# Patient Record
Sex: MALE | URBAN - METROPOLITAN AREA
[De-identification: names, ages, dates, MRNs, and addresses within clinical notes are randomized per-mention and may not be internally consistent; named-entity substitution may affect disease eponyms.]

---

## 2024-06-27 ENCOUNTER — APPOINTMENT (EMERGENCY)
Dept: ULTRASOUND IMAGING | Facility: HOSPITAL | Age: 24
End: 2024-06-27
Payer: COMMERCIAL

## 2024-06-27 ENCOUNTER — HOSPITAL ENCOUNTER (EMERGENCY)
Facility: HOSPITAL | Age: 24
Discharge: HOME | End: 2024-06-27
Attending: EMERGENCY MEDICINE
Payer: COMMERCIAL

## 2024-06-27 VITALS
DIASTOLIC BLOOD PRESSURE: 75 MMHG | SYSTOLIC BLOOD PRESSURE: 131 MMHG | HEART RATE: 64 BPM | WEIGHT: 113.7 LBS | OXYGEN SATURATION: 100 % | TEMPERATURE: 98.3 F | RESPIRATION RATE: 16 BRPM

## 2024-06-27 DIAGNOSIS — R10.10 UPPER ABDOMINAL PAIN, UNSPECIFIED: Primary | ICD-10-CM

## 2024-06-27 LAB
ALBUMIN SERPL BCG-MCNC: 4.9 G/DL (ref 3.5–5)
ALP SERPL-CCNC: 57 U/L (ref 34–104)
ALT SERPL W P-5'-P-CCNC: 11 U/L (ref 7–52)
ANION GAP SERPL CALCULATED.3IONS-SCNC: 9 MMOL/L (ref 4–13)
AST SERPL W P-5'-P-CCNC: 19 U/L (ref 13–39)
BASOPHILS # BLD AUTO: 0.06 THOUSANDS/ÂΜL (ref 0–0.1)
BASOPHILS NFR BLD AUTO: 1 % (ref 0–1)
BILIRUB SERPL-MCNC: 0.76 MG/DL (ref 0.2–1)
BUN SERPL-MCNC: 20 MG/DL (ref 5–25)
CALCIUM SERPL-MCNC: 9.7 MG/DL (ref 8.4–10.2)
CHLORIDE SERPL-SCNC: 102 MMOL/L (ref 96–108)
CO2 SERPL-SCNC: 27 MMOL/L (ref 21–32)
CREAT SERPL-MCNC: 0.78 MG/DL (ref 0.6–1.3)
EOSINOPHIL # BLD AUTO: 1.61 THOUSAND/ÂΜL (ref 0–0.61)
EOSINOPHIL NFR BLD AUTO: 15 % (ref 0–6)
ERYTHROCYTE [DISTWIDTH] IN BLOOD BY AUTOMATED COUNT: 11.8 % (ref 11.6–15.1)
GFR SERPL CREATININE-BSD FRML MDRD: 128 ML/MIN/1.73SQ M
GLUCOSE SERPL-MCNC: 95 MG/DL (ref 65–140)
HCT VFR BLD AUTO: 47.2 % (ref 36.5–49.3)
HGB BLD-MCNC: 16.2 G/DL (ref 12–17)
IMM GRANULOCYTES # BLD AUTO: 0.04 THOUSAND/UL (ref 0–0.2)
IMM GRANULOCYTES NFR BLD AUTO: 0 % (ref 0–2)
LIPASE SERPL-CCNC: 19 U/L (ref 11–82)
LYMPHOCYTES # BLD AUTO: 1.62 THOUSANDS/ÂΜL (ref 0.6–4.47)
LYMPHOCYTES NFR BLD AUTO: 15 % (ref 14–44)
MCH RBC QN AUTO: 30.3 PG (ref 26.8–34.3)
MCHC RBC AUTO-ENTMCNC: 34.3 G/DL (ref 31.4–37.4)
MCV RBC AUTO: 88 FL (ref 82–98)
MONOCYTES # BLD AUTO: 0.68 THOUSAND/ÂΜL (ref 0.17–1.22)
MONOCYTES NFR BLD AUTO: 6 % (ref 4–12)
NEUTROPHILS # BLD AUTO: 6.79 THOUSANDS/ÂΜL (ref 1.85–7.62)
NEUTS SEG NFR BLD AUTO: 63 % (ref 43–75)
NRBC BLD AUTO-RTO: 0 /100 WBCS
PLATELET # BLD AUTO: 179 THOUSANDS/UL (ref 149–390)
PMV BLD AUTO: 9.2 FL (ref 8.9–12.7)
POTASSIUM SERPL-SCNC: 3.9 MMOL/L (ref 3.5–5.3)
PROT SERPL-MCNC: 8 G/DL (ref 6.4–8.4)
RBC # BLD AUTO: 5.34 MILLION/UL (ref 3.88–5.62)
SODIUM SERPL-SCNC: 138 MMOL/L (ref 135–147)
WBC # BLD AUTO: 10.8 THOUSAND/UL (ref 4.31–10.16)

## 2024-06-27 PROCEDURE — 96360 HYDRATION IV INFUSION INIT: CPT

## 2024-06-27 PROCEDURE — 83690 ASSAY OF LIPASE: CPT | Performed by: PHYSICIAN ASSISTANT

## 2024-06-27 PROCEDURE — 99284 EMERGENCY DEPT VISIT MOD MDM: CPT | Performed by: PHYSICIAN ASSISTANT

## 2024-06-27 PROCEDURE — 99284 EMERGENCY DEPT VISIT MOD MDM: CPT

## 2024-06-27 PROCEDURE — 85025 COMPLETE CBC W/AUTO DIFF WBC: CPT | Performed by: PHYSICIAN ASSISTANT

## 2024-06-27 PROCEDURE — 36415 COLL VENOUS BLD VENIPUNCTURE: CPT | Performed by: PHYSICIAN ASSISTANT

## 2024-06-27 PROCEDURE — 76705 ECHO EXAM OF ABDOMEN: CPT

## 2024-06-27 PROCEDURE — 80053 COMPREHEN METABOLIC PANEL: CPT | Performed by: PHYSICIAN ASSISTANT

## 2024-06-27 RX ADMIN — SODIUM CHLORIDE 1000 ML: 0.9 INJECTION, SOLUTION INTRAVENOUS at 16:40

## 2024-06-27 NOTE — ED NOTES
Pt in no apparent distress at this time.  Resps easy and regular.    Awaiting US report.     Ag Lowry RN  06/27/24 1942

## 2024-06-27 NOTE — ED PROVIDER NOTES
"History  Chief Complaint   Patient presents with    Abdominal Pain     2 weeks of abd pain that gets worse after eating     24-year-old male presents today for evaluation of right upper quadrant abdominal pain worse after eating.  He reports abdominal surgical history only significant for hernia repair when he was \"a baby\".  He reports he is unsure of what hernia he had repaired.  He denies fevers, chills, n/v/d.  He reports no urinary symptoms and reports no radiation to the back.  He reports he does not drink alcohol regularly and denies any medical conditions to his knowledge.      Abdominal Pain  Associated symptoms: no chest pain, no chills, no dysuria, no fatigue, no fever, no hematuria, no nausea, no shortness of breath, no sore throat and no vomiting        None       History reviewed. No pertinent past medical history.    Past Surgical History:   Procedure Laterality Date    HERNIA REPAIR         History reviewed. No pertinent family history.  I have reviewed and agree with the history as documented.    E-Cigarette/Vaping     E-Cigarette/Vaping Substances     Social History     Tobacco Use    Smoking status: Never     Passive exposure: Never    Smokeless tobacco: Never   Substance Use Topics    Alcohol use: Yes     Comment: occ    Drug use: Never       Review of Systems   Constitutional:  Negative for chills, fatigue and fever.   HENT:  Negative for congestion, ear pain, rhinorrhea and sore throat.    Eyes:  Negative for redness.   Respiratory:  Negative for chest tightness and shortness of breath.    Cardiovascular:  Negative for chest pain and palpitations.   Gastrointestinal:  Positive for abdominal pain. Negative for nausea and vomiting.   Genitourinary:  Negative for dysuria and hematuria.   Musculoskeletal: Negative.    Skin:  Negative for rash.   Neurological:  Negative for dizziness, syncope, light-headedness and numbness.       Physical Exam  Physical Exam  Vitals and nursing note reviewed. "   Constitutional:       Appearance: Normal appearance. He is well-developed.   HENT:      Head: Normocephalic and atraumatic.   Eyes:      General: No scleral icterus.     Pupils: Pupils are equal, round, and reactive to light.   Cardiovascular:      Rate and Rhythm: Normal rate and regular rhythm.      Pulses: Normal pulses.   Pulmonary:      Effort: Pulmonary effort is normal. No respiratory distress.      Breath sounds: No stridor.   Abdominal:      General: There is no distension.      Palpations: There is no mass.   Musculoskeletal:      Cervical back: Normal range of motion.   Skin:     General: Skin is warm and dry.      Capillary Refill: Capillary refill takes less than 2 seconds.      Coloration: Skin is not jaundiced.   Neurological:      Mental Status: He is alert and oriented to person, place, and time.      Gait: Gait normal.   Psychiatric:         Mood and Affect: Mood normal.         Vital Signs  ED Triage Vitals   Temperature Pulse Respirations Blood Pressure SpO2   06/27/24 1620 06/27/24 1620 06/27/24 1620 06/27/24 1620 06/27/24 1620   98.3 °F (36.8 °C) 86 18 162/92 99 %      Temp Source Heart Rate Source Patient Position - Orthostatic VS BP Location FiO2 (%)   06/27/24 1620 06/27/24 1620 06/27/24 1620 06/27/24 1620 --   Oral Monitor Lying Left arm       Pain Score       06/27/24 1752       No Pain           Vitals:    06/27/24 1620 06/27/24 1935   BP: 162/92 131/75   Pulse: 86 64   Patient Position - Orthostatic VS: Lying          Visual Acuity      ED Medications  Medications   sodium chloride 0.9 % bolus 1,000 mL (0 mL Intravenous Stopped 6/27/24 1748)       Diagnostic Studies  Results Reviewed       Procedure Component Value Units Date/Time    Comprehensive metabolic panel [[id number]] Collected: 06/27/24 1640    Lab Status: Final result Specimen: Blood from Arm, Right Updated: 06/27/24 1712     Sodium 138 mmol/L      Potassium 3.9 mmol/L      Chloride 102 mmol/L      CO2 27 mmol/L      ANION GAP  9 mmol/L      BUN 20 mg/dL      Creatinine 0.78 mg/dL      Glucose 95 mg/dL      Calcium 9.7 mg/dL      AST 19 U/L      ALT 11 U/L      Alkaline Phosphatase 57 U/L      Total Protein 8.0 g/dL      Albumin 4.9 g/dL      Total Bilirubin 0.76 mg/dL      eGFR 128 ml/min/1.73sq m     Narrative:      National Kidney Disease Foundation guidelines for Chronic Kidney Disease (CKD):     Stage 1 with normal or high GFR (GFR > 90 mL/min/1.73 square meters)    Stage 2 Mild CKD (GFR = 60-89 mL/min/1.73 square meters)    Stage 3A Moderate CKD (GFR = 45-59 mL/min/1.73 square meters)    Stage 3B Moderate CKD (GFR = 30-44 mL/min/1.73 square meters)    Stage 4 Severe CKD (GFR = 15-29 mL/min/1.73 square meters)    Stage 5 End Stage CKD (GFR <15 mL/min/1.73 square meters)  Note: GFR calculation is accurate only with a steady state creatinine    Lipase [[id number]]  (Normal) Collected: 06/27/24 1640    Lab Status: Final result Specimen: Blood from Arm, Right Updated: 06/27/24 1712     Lipase 19 u/L     CBC and differential [[id number]]  (Abnormal) Collected: 06/27/24 1640    Lab Status: Final result Specimen: Blood from Arm, Right Updated: 06/27/24 1655     WBC 10.80 Thousand/uL      RBC 5.34 Million/uL      Hemoglobin 16.2 g/dL      Hematocrit 47.2 %      MCV 88 fL      MCH 30.3 pg      MCHC 34.3 g/dL      RDW 11.8 %      MPV 9.2 fL      Platelets 179 Thousands/uL      nRBC 0 /100 WBCs      Segmented % 63 %      Immature Grans % 0 %      Lymphocytes % 15 %      Monocytes % 6 %      Eosinophils Relative 15 %      Basophils Relative 1 %      Absolute Neutrophils 6.79 Thousands/µL      Absolute Immature Grans 0.04 Thousand/uL      Absolute Lymphocytes 1.62 Thousands/µL      Absolute Monocytes 0.68 Thousand/µL      Eosinophils Absolute 1.61 Thousand/µL      Basophils Absolute 0.06 Thousands/µL                    US right upper quadrant   Final Result by Corey Martínez MD (06/27 1934)      Normal right upper quadrant ultrasound. No  sonographic evidence of acute cholecystitis.      Workstation performed: [id number]                    Procedures  Procedures         ED Course  ED Course as of 06/27/24 2158   Thu Jun 27, 2024 1946      Normal right upper quadrant ultrasound. No sonographic evidence of acute cholecystitis.     1946 Patient was reexamined at this time and informed of laboratory and/or imaging results and was found to be stable for discharge.  Return to emergency department criteria was reviewed with the patient who verbalized understanding and was agreeable to discharge and the treatment plan at this time.                                   SBIRT 20yo+      Flowsheet Row Most Recent Value   Initial Alcohol Screen: US AUDIT-C     1. How often do you have a drink containing alcohol? 0 Filed at: 06/27/2024 1649   2. How many drinks containing alcohol do you have on a typical day you are drinking?  0 Filed at: 06/27/2024 1649   3a. Male UNDER 65: How often do you have five or more drinks on one occasion? 0 Filed at: 06/27/2024 1649   3b. FEMALE Any Age, or MALE 65+: How often do you have 4 or more drinks on one occassion? 0 Filed at: 06/27/2024 1649   Audit-C Score 0 Filed at: 06/27/2024 1649   MARCUS: How many times in the past year have you...    Used an illegal drug or used a prescription medication for non-medical reasons? Never Filed at: 06/27/2024 1649                      Medical Decision Making  24-year-old male presenting for evaluation of right upper quadrant abdominal pain intermittent and ongoing for the past 2 to 3 weeks worse when eating.  Patient reports no pain is present on exam today however concern for cholelithiasis versus cholecystitis versus biliary colic for which reason right upper quadrant ultrasound was obtained.  Basic labs to include a lipase for potential pancreatitis and to evaluate for potential metabolic disturbances.  Workup is unremarkable patient advised to follow-up with GI specialist for potential  "gastritis versus biliary colic versus other potential nonemergent etiologies    All imaging and/or lab testing discussed with patient, strict return to ED precautions discussed. Patient and/or family members verbalizes understanding and agrees with plan. Patient is stable for discharge     Portions of the record may have been created with voice recognition software. Occasional wrong word or \"sound a like\" substitutions may have occurred due to the inherent limitations of voice recognition software. Read the chart carefully and recognize, using context, where substitutions have occurred.    Amount and/or Complexity of Data Reviewed  Labs: ordered.  Radiology: ordered.             Disposition  Final diagnoses:   Upper abdominal pain, unspecified     Time reflects when diagnosis was documented in both MDM as applicable and the Disposition within this note       Time User Action Codes Description Comment    6/27/2024  7:46 PM Michell Holley Add [R10.10] Upper abdominal pain, unspecified           ED Disposition       ED Disposition   Discharge    Condition   Stable    Date/Time   Thu Jun 27, 2024 1946    Comment   Charan Trivedi discharge to home/self care.                   Follow-up Information       Follow up With Specialties Details Why Contact Info Additional Information    [facility] Schedule an appointment as soon as possible for a visit  As needed [address]  [address]  [facility] [address] [phone] [facility] [place], [facility] [address], [place], [place], [address] [phone]            There are no discharge medications for this patient.      No discharge procedures on file.    PDMP Review       None            ED Provider  Electronically Signed by             Michell Holley PA-C  06/27/24 2158    "

## 2024-06-27 NOTE — Clinical Note
Charan Trivedi was seen and treated in our emergency department on 6/27/2024.                Diagnosis:     Charan  may return to work on return date.    He may return on this date: 06/28/2024         If you have any questions or concerns, please don't hesitate to call.      Michell Holley PA-C    ______________________________           _______________          _______________  Hospital Representative                              Date                                Time

## 2024-07-31 ENCOUNTER — TELEPHONE (OUTPATIENT)
Dept: GASTROENTEROLOGY | Facility: MEDICAL CENTER | Age: 24
End: 2024-07-31

## 2024-07-31 NOTE — TELEPHONE ENCOUNTER
Called and spoke to patient concerning his appointment being rescheduled from 08/27 to 08/26 due to the provider not being in the office on 08/27. Patient was agreeable

## 2024-10-02 ENCOUNTER — OFFICE VISIT (OUTPATIENT)
Dept: GASTROENTEROLOGY | Facility: MEDICAL CENTER | Age: 24
End: 2024-10-02
Payer: COMMERCIAL

## 2024-10-02 VITALS
OXYGEN SATURATION: 98 % | WEIGHT: 115 LBS | TEMPERATURE: 97.8 F | HEIGHT: 67 IN | BODY MASS INDEX: 18.05 KG/M2 | HEART RATE: 65 BPM | SYSTOLIC BLOOD PRESSURE: 126 MMHG | DIASTOLIC BLOOD PRESSURE: 74 MMHG

## 2024-10-02 DIAGNOSIS — R10.13 EPIGASTRIC PAIN: ICD-10-CM

## 2024-10-02 DIAGNOSIS — A04.8 H. PYLORI INFECTION: Primary | ICD-10-CM

## 2024-10-02 DIAGNOSIS — R63.6 LOW WEIGHT: ICD-10-CM

## 2024-10-02 DIAGNOSIS — K21.9 GASTROESOPHAGEAL REFLUX DISEASE, UNSPECIFIED WHETHER ESOPHAGITIS PRESENT: ICD-10-CM

## 2024-10-02 PROCEDURE — 99204 OFFICE O/P NEW MOD 45 MIN: CPT | Performed by: STUDENT IN AN ORGANIZED HEALTH CARE EDUCATION/TRAINING PROGRAM

## 2024-10-02 NOTE — PROGRESS NOTES
[facility] - Outpatient Consultation  Charan Wolfetran 24 y.o. male MRN: [id number]  Encounter: [id number]          ASSESSMENT AND PLAN:    25 yo M here for several months of epigastric pain/reflux.  Was diagnosed with H pylori in July and treated with triple therapy.  Unclear if his worsening/recurrence of symptoms are due to H pylori not being eradicated or underlying peptic irritation due to stopping PPI.  Discussed stool testing vs endoscopy and patient would like to proceed with stool testing initially which is reasonable given his lack of red flags.  1. H. pylori infection  2. Epigastric pain  3. Gastroesophageal reflux disease, unspecified whether esophagitis present  - H. pylori antigen, stool; Future. Will complete stool antigen testing before restarting PPI  - omeprazole (PriLOSEC) 20 mg delayed release capsule; Take 1 capsule (20 mg total) by mouth daily  Dispense: 30 capsule; Refill: 11    4. Low weight  Reports lifelong issues with gaining weight. Will screen for Celiac.  Also encouraged him to complete TSH ordered by PCP  - IgA; Future  - Tissue transglutaminase, IgA; Future    ______________________________________________________________________    HPI:    In June or July, started getting severe epigastric pain.  Seen in ED and by PCP and diagnosed with H pylori.  Completed treatment a couple of months ago with triple therapy.  After treatment, felt better and able to eat more.  2 weeks ago, felt great but pain is coming back now.  Stopped omeprazole about 1 month ago.    Pain feels like sharp epigastric pain, lasts hours.  Worse with spicy food.  Lost a little weight initially but now gained back.  No dysphagia.  Bowels are regular, no blood in stool.  No NSAID.      No tobacco, social alcohol, no marijuana.    No family history of GI malignancy.    7/11/2024 H. pylori stool antigen positive  6/27/24 RUQ US Normal right upper quadrant ultrasound. No  "sonographic evidence of acute cholecystitis.     REVIEW OF SYSTEMS:    CONSTITUTIONAL: Denies any fever, chills, rigors, and weight loss.  HEENT: No earache or tinnitus. Denies hearing loss or visual disturbances.  CARDIOVASCULAR: No chest pain or palpitations.   RESPIRATORY: Denies any cough, hemoptysis, shortness of breath or dyspnea on exertion.  GASTROINTESTINAL: As noted in the History of Present Illness.   GENITOURINARY: No problems with urination. Denies any hematuria or dysuria.  NEUROLOGIC: No dizziness or vertigo, denies headaches.   MUSCULOSKELETAL: Denies any muscle or joint pain.   SKIN: Denies skin rashes or itching.   ENDOCRINE: Denies excessive thirst. Denies intolerance to heat or cold.  PSYCHOSOCIAL: Denies depression or anxiety. Denies any recent memory loss.       Historical Information   History reviewed. No pertinent past medical history.  Past Surgical History:   Procedure Laterality Date    HERNIA REPAIR       Social History   Social History     Substance and Sexual Activity   Alcohol Use Yes    Comment: occ     Social History     Substance and Sexual Activity   Drug Use Never     Social History     Tobacco Use   Smoking Status Never    Passive exposure: Never   Smokeless Tobacco Never     History reviewed. No pertinent family history.    Meds/Allergies       Current Outpatient Medications:     omeprazole (PriLOSEC) 20 mg delayed release capsule    pantoprazole (PROTONIX) 40 mg tablet    No Known Allergies        Objective     Blood pressure 126/74, pulse 65, temperature 97.8 °F (36.6 °C), height 5' 7\" (1.702 m), weight 52.2 kg (115 lb), SpO2 98%. Body mass index is 18.01 kg/m².        PHYSICAL EXAM:      General Appearance:   Alert, cooperative, no distress   HEENT:   Normocephalic, atraumatic, anicteric.     Neck:  Supple, symmetrical, trachea midline   Lungs:   Clear to auscultation bilaterally; no rales, rhonchi or wheezing; respirations unlabored    Heart::   Regular rate and rhythm; no " murmur, rub, or gallop.   Abdomen:   Soft, non-tender, non-distended; normal bowel sounds; no masses, no organomegaly    Genitalia:   Deferred    Rectal:   Deferred    Extremities:  No cyanosis, clubbing or edema    Pulses:  2+ and symmetric    Skin:  No jaundice, rashes, or lesions    Lymph nodes:  No palpable cervical lymphadenopathy        Lab Results:   No visits with results within 1 Day(s) from this visit.   Latest known visit with results is:   Admission on 06/27/2024, Discharged on 06/27/2024   Component Date Value    WBC 06/27/2024 10.80 (H)     RBC 06/27/2024 5.34     Hemoglobin 06/27/2024 16.2     Hematocrit 06/27/2024 47.2     MCV 06/27/2024 88     MCH 06/27/2024 30.3     MCHC 06/27/2024 34.3     RDW 06/27/2024 11.8     MPV 06/27/2024 9.2     Platelets 06/27/2024 179     nRBC 06/27/2024 0     Segmented % 06/27/2024 63     Immature Grans % 06/27/2024 0     Lymphocytes % 06/27/2024 15     Monocytes % 06/27/2024 6     Eosinophils Relative 06/27/2024 15 (H)     Basophils Relative 06/27/2024 1     Absolute Neutrophils 06/27/2024 6.79     Absolute Immature Grans 06/27/2024 0.04     Absolute Lymphocytes 06/27/2024 1.62     Absolute Monocytes 06/27/2024 0.68     Eosinophils Absolute 06/27/2024 1.61 (H)     Basophils Absolute 06/27/2024 0.06     Sodium 06/27/2024 138     Potassium 06/27/2024 3.9     Chloride 06/27/2024 102     CO2 06/27/2024 27     ANION GAP 06/27/2024 9     BUN 06/27/2024 20     Creatinine 06/27/2024 0.78     Glucose 06/27/2024 95     Calcium 06/27/2024 9.7     AST 06/27/2024 19     ALT 06/27/2024 11     Alkaline Phosphatase 06/27/2024 57     Total Protein 06/27/2024 8.0     Albumin 06/27/2024 4.9     Total Bilirubin 06/27/2024 0.76     eGFR 06/27/2024 128     Lipase 06/27/2024 19          Radiology Results:   No results found.

## 2024-10-25 DIAGNOSIS — R10.13 EPIGASTRIC PAIN: ICD-10-CM

## 2024-10-25 DIAGNOSIS — K21.9 GASTROESOPHAGEAL REFLUX DISEASE, UNSPECIFIED WHETHER ESOPHAGITIS PRESENT: ICD-10-CM

## 2024-12-30 ENCOUNTER — TELEPHONE (OUTPATIENT)
Dept: GASTROENTEROLOGY | Facility: MEDICAL CENTER | Age: 24
End: 2024-12-30

## 2024-12-30 NOTE — TELEPHONE ENCOUNTER
Called patient to reschedule office visit for 04/01/2025, patient stated would like to cancel at this time since he is feeling better. Will call back to schedule if there is any issues.